# Patient Record
Sex: FEMALE | Race: WHITE | NOT HISPANIC OR LATINO | Employment: FULL TIME | URBAN - METROPOLITAN AREA
[De-identification: names, ages, dates, MRNs, and addresses within clinical notes are randomized per-mention and may not be internally consistent; named-entity substitution may affect disease eponyms.]

---

## 2018-08-10 ENCOUNTER — HOSPITAL ENCOUNTER (OUTPATIENT)
Dept: OTHER | Facility: HOSPITAL | Age: 58
Setting detail: SPECIMEN
Discharge: HOME | End: 2018-08-10
Attending: SURGERY | Admitting: SURGERY

## 2022-09-25 ENCOUNTER — APPOINTMENT (OUTPATIENT)
Dept: GENERAL RADIOLOGY | Facility: HOSPITAL | Age: 62
End: 2022-09-25

## 2022-09-25 ENCOUNTER — APPOINTMENT (OUTPATIENT)
Dept: CARDIOLOGY | Facility: HOSPITAL | Age: 62
End: 2022-09-25

## 2022-09-25 ENCOUNTER — HOSPITAL ENCOUNTER (EMERGENCY)
Facility: HOSPITAL | Age: 62
Discharge: HOME | End: 2022-09-25
Attending: EMERGENCY MEDICINE | Admitting: EMERGENCY MEDICINE

## 2022-09-25 VITALS
RESPIRATION RATE: 16 BRPM | HEART RATE: 97 BPM | OXYGEN SATURATION: 97 % | SYSTOLIC BLOOD PRESSURE: 139 MMHG | DIASTOLIC BLOOD PRESSURE: 63 MMHG | TEMPERATURE: 99 F

## 2022-09-25 DIAGNOSIS — R22.41 LOWER LEG MASS, RIGHT: ICD-10-CM

## 2022-09-25 DIAGNOSIS — S83.8X2A ACUTE MENISCAL INJURY OF LEFT KNEE, INITIAL ENCOUNTER: Primary | ICD-10-CM

## 2022-09-25 LAB
BH CV LOW VAS LEFT POPLITEAL SPONT: 1
BH CV LOWER VASCULAR LEFT COMMON FEMORAL AUGMENT: NORMAL
BH CV LOWER VASCULAR LEFT COMMON FEMORAL COMPETENT: NORMAL
BH CV LOWER VASCULAR LEFT COMMON FEMORAL COMPRESS: NORMAL
BH CV LOWER VASCULAR LEFT COMMON FEMORAL PHASIC: NORMAL
BH CV LOWER VASCULAR LEFT COMMON FEMORAL SPONT: NORMAL
BH CV LOWER VASCULAR LEFT DISTAL FEMORAL COMPRESS: NORMAL
BH CV LOWER VASCULAR LEFT GASTRONEMIUS COMPRESS: NORMAL
BH CV LOWER VASCULAR LEFT GREATER SAPH AK COMPRESS: NORMAL
BH CV LOWER VASCULAR LEFT GREATER SAPH BK COMPRESS: NORMAL
BH CV LOWER VASCULAR LEFT LESSER SAPH COMPRESS: NORMAL
BH CV LOWER VASCULAR LEFT MID FEMORAL AUGMENT: NORMAL
BH CV LOWER VASCULAR LEFT MID FEMORAL COMPETENT: NORMAL
BH CV LOWER VASCULAR LEFT MID FEMORAL COMPRESS: NORMAL
BH CV LOWER VASCULAR LEFT MID FEMORAL PHASIC: NORMAL
BH CV LOWER VASCULAR LEFT MID FEMORAL SPONT: NORMAL
BH CV LOWER VASCULAR LEFT PERONEAL COMPRESS: NORMAL
BH CV LOWER VASCULAR LEFT POPLITEAL AUGMENT: NORMAL
BH CV LOWER VASCULAR LEFT POPLITEAL COMPETENT: NORMAL
BH CV LOWER VASCULAR LEFT POPLITEAL COMPRESS: NORMAL
BH CV LOWER VASCULAR LEFT POPLITEAL PHASIC: NORMAL
BH CV LOWER VASCULAR LEFT POPLITEAL SPONT: NORMAL
BH CV LOWER VASCULAR LEFT POSTERIOR TIBIAL COMPRESS: NORMAL
BH CV LOWER VASCULAR LEFT PROFUNDA FEMORAL COMPRESS: NORMAL
BH CV LOWER VASCULAR LEFT PROXIMAL FEMORAL COMPRESS: NORMAL
BH CV LOWER VASCULAR LEFT SAPHENOFEMORAL JUNCTION COMPRESS: NORMAL
BH CV LOWER VASCULAR RIGHT COMMON FEMORAL AUGMENT: NORMAL
BH CV LOWER VASCULAR RIGHT COMMON FEMORAL COMPETENT: NORMAL
BH CV LOWER VASCULAR RIGHT COMMON FEMORAL COMPRESS: NORMAL
BH CV LOWER VASCULAR RIGHT COMMON FEMORAL PHASIC: NORMAL
BH CV LOWER VASCULAR RIGHT COMMON FEMORAL SPONT: NORMAL
BH CV LOWER VASCULAR RIGHT DISTAL FEMORAL COMPRESS: NORMAL
BH CV LOWER VASCULAR RIGHT GASTRONEMIUS COMPRESS: NORMAL
BH CV LOWER VASCULAR RIGHT GREATER SAPH AK COMPRESS: NORMAL
BH CV LOWER VASCULAR RIGHT GREATER SAPH BK COMPRESS: NORMAL
BH CV LOWER VASCULAR RIGHT LESSER SAPH COMPRESS: NORMAL
BH CV LOWER VASCULAR RIGHT MID FEMORAL AUGMENT: NORMAL
BH CV LOWER VASCULAR RIGHT MID FEMORAL COMPETENT: NORMAL
BH CV LOWER VASCULAR RIGHT MID FEMORAL COMPRESS: NORMAL
BH CV LOWER VASCULAR RIGHT MID FEMORAL PHASIC: NORMAL
BH CV LOWER VASCULAR RIGHT MID FEMORAL SPONT: NORMAL
BH CV LOWER VASCULAR RIGHT PERONEAL COMPRESS: NORMAL
BH CV LOWER VASCULAR RIGHT POPLITEAL AUGMENT: NORMAL
BH CV LOWER VASCULAR RIGHT POPLITEAL COMPETENT: NORMAL
BH CV LOWER VASCULAR RIGHT POPLITEAL COMPRESS: NORMAL
BH CV LOWER VASCULAR RIGHT POPLITEAL PHASIC: NORMAL
BH CV LOWER VASCULAR RIGHT POPLITEAL SPONT: NORMAL
BH CV LOWER VASCULAR RIGHT POSTERIOR TIBIAL COMPRESS: NORMAL
BH CV LOWER VASCULAR RIGHT PROFUNDA FEMORAL COMPRESS: NORMAL
BH CV LOWER VASCULAR RIGHT PROXIMAL FEMORAL COMPRESS: NORMAL
BH CV LOWER VASCULAR RIGHT SAPHENOFEMORAL JUNCTION COMPRESS: NORMAL
BH CV POP FLUID COLLECT LEFT: 1
MAXIMAL PREDICTED HEART RATE: 160 BPM
STRESS TARGET HR: 136 BPM

## 2022-09-25 PROCEDURE — 73560 X-RAY EXAM OF KNEE 1 OR 2: CPT

## 2022-09-25 PROCEDURE — 99282 EMERGENCY DEPT VISIT SF MDM: CPT

## 2022-09-25 PROCEDURE — 73590 X-RAY EXAM OF LOWER LEG: CPT

## 2022-09-25 PROCEDURE — 93970 EXTREMITY STUDY: CPT

## 2022-09-25 RX ORDER — DICLOFENAC SODIUM 75 MG/1
75 TABLET, DELAYED RELEASE ORAL 2 TIMES DAILY PRN
Qty: 20 TABLET | Refills: 0 | Status: SHIPPED | OUTPATIENT
Start: 2022-09-25

## 2022-09-25 NOTE — ED PROVIDER NOTES
EMERGENCY DEPARTMENT ENCOUNTER    Room Number:  01/01  Date of encounter:  9/25/2022  PCP: Provider, No Known  Historian: Patient      I used full protective equipment while examining this patient.  This includes face mask, gloves and protective eyewear.  I washed my hands before entering the room and immediately upon leaving the room      HPI:  Chief Complaint: Left knee pain, right lower leg pain  A complete HPI/ROS/PMH/PSH/SH/FH are unobtainable due to: Nothing    Context: Nhoemi Schmitt is a 62 y.o. female who presents to the ED c/o approximate 1 week history of gradual onset, constant, atraumatic left knee, as well as right lower leg pain.  Patient localizes her left knee pain to the medial joint line.  The pain is worse with ambulating and flexion.  She denies any redness, swelling, fever.  The pain is achy and moderate.  There is no radiation of pain.  She denies any prior knee injuries.    In addition patient complains of a swollen area to her right anterior lower leg.  She denies any injury to this.  She denies any calf pain.  She denies any numbness or tingling distally.  She is concerned possibly that this area has been irritated patient is concerned about blood clots in both of her legs.  She denies any chest pain or shortness of breath.  She denies any hormone usage.  She denies any prior history of blood clots.  By her boot.    Review of Medical Records  No pertinent previous medical records found in [facility].    PAST MEDICAL HISTORY  Active Ambulatory Problems     Diagnosis Date Noted   • No Active Ambulatory Problems     Resolved Ambulatory Problems     Diagnosis Date Noted   • No Resolved Ambulatory Problems     No Additional Past Medical History         PAST SURGICAL HISTORY  History reviewed. No pertinent surgical history.      FAMILY HISTORY  History reviewed. No pertinent family history.      SOCIAL HISTORY  Social History     Socioeconomic History   • Marital status:           ALLERGIES  Penicillins        REVIEW OF SYSTEMS  All systems reviewed and negative except for those discussed in HPI.       PHYSICAL EXAM    I have reviewed the triage vital signs and nursing notes.    ED Triage Vitals [09/25/22 1535]   Temp Heart Rate Resp BP SpO2   99 °F (37.2 °C) 97 16 -- 97 %      Temp src Heart Rate Source Patient Position BP Location FiO2 (%)   Tympanic -- -- -- --       Physical Exam  GENERAL: Alert, oriented, not distressed  HENT: head atraumatic, no nuchal rigidity  EYES: no scleral icterus, EOMI  CV: regular rhythm, regular rate, no murmur  RESPIRATORY: normal effort, CTA  ABDOMEN: soft, nontender  MUSCULOSKELETAL: Mild tenderness to the left medial joint line of the knee.  No significant effusion.  No warmth or erythema.  Remainder of the knee is nontender.  No laxity.  Left calf is nontender and not swollen.  Neurovascularly intact distally.  There is a soft flesh-colored mass to the right distal lower anterior leg.  No induration or erythema.  It appears in the soft tissue separate from the bone.  There is no right calf swelling  or tenderness.    NEURO: alert, moves all extremities, follows commands  SKIN: warm, dry        LAB RESULTS  Recent Results (from the past 24 hour(s))   Duplex Venous Lower Extremity - Bilateral CAR    Collection Time: 09/25/22  5:19 PM   Result Value Ref Range    Target HR (85%) 136 bpm    Max. Pred. HR (100%) 160 bpm    Left Popliteal Spont 1     Right Common Femoral Spont Y     Right Common Femoral Phasic Y     Right Common Femoral Augment Y     Right Common Femoral Competent Y     Right Common Femoral Compress C     Right Saphenofemoral Junction Compress C     Right Profunda Femoral Compress C     Right Proximal Femoral Compress C     Right Mid Femoral Spont Y     Right Mid Femoral Phasic Y     Right Mid Femoral Augment Y     Right Mid Femoral Competent Y     Right Mid Femoral Compress C     Right Distal Femoral Compress C     Right Popliteal Spont  Y     Right Popliteal Phasic Y     Right Popliteal Augment Y     Right Popliteal Competent Y     Right Popliteal Compress C     Right Posterior Tibial Compress C     Right Peroneal Compress C     Right Gastronemius Compress C     Right Greater Saph AK Compress C     Right Greater Saph BK Compress C     Right Lesser Saph Compress C     Left Common Femoral Spont Y     Left Common Femoral Phasic Y     Left Common Femoral Augment Y     Left Common Femoral Competent Y     Left Common Femoral Compress C     Left Saphenofemoral Junction Compress C     Left Profunda Femoral Compress C     Left Proximal Femoral Compress C     Left Mid Femoral Spont Y     Left Mid Femoral Phasic Y     Left Mid Femoral Augment Y     Left Mid Femoral Competent Y     Left Mid Femoral Compress C     Left Distal Femoral Compress C     Left Popliteal Spont Y     Left Popliteal Phasic Y     Left Popliteal Augment Y     Left Popliteal Competent Y     Left Popliteal Compress C     Left Posterior Tibial Compress C     Left Peroneal Compress C     Left Gastronemius Compress C     Left Greater Saph AK Compress C     Left Greater Saph BK Compress C     Left Lesser Saph Compress C     BH CV POP FLUID COLLECT LEFT 1        Ordered the above labs and independently reviewed the results.        RADIOLOGY  XR Knee 1 or 2 View Left, XR Tibia Fibula 2 View Right    Result Date: 9/25/2022  XR KNEE 1 OR 2 VW LEFT-, XR TIBIA FIBULA 2 VW RIGHT-  INDICATIONS: Pain  TECHNIQUE: 2 views of the left knee, frontal and lateral views of the right lower leg.  COMPARISON: None available  FINDINGS:  Left knee: Mild degenerative spurring is present. Minimal knee effusion is suggested. An ossific possible loose body medially adjacent to the medial femoral condyle measures 9 mm.  Right lower leg: Moderate degenerative spurring is seen at the knee with moderate medial tibiofemoral joint space narrowing. Dystrophic soft tissue calcifications are seen in the lower leg. Soft tissue  swelling is apparent, especially anteriorly at the distal lower leg, that could be evidence of inflammation, infection, injury, correlate clinically. Moderate calcaneal spurring is present.  Follow-up/further evaluation can be obtained as indications persist.       As described.  This report was finalized on 9/25/2022 4:51 PM by Dr. Gio Barba M.D.        I ordered the above noted radiological studies. Reviewed by me and discussed with radiologist.  See dictation for official radiology interpretation.      MEDICATIONS GIVEN IN ER    Medications - No data to display      PROGRESS, DATA ANALYSIS, CONSULTS, AND MEDICAL DECISION MAKING    All labs have been independently reviewed by me.  All radiology studies have been reviewed by me and discussed with radiologist dictating the report.   EKG's independently viewed and interpreted by me.  Discussion below represents my analysis of pertinent findings related to patient's condition, differential diagnosis, treatment plan and final disposition.    I have discussed case with Dr. Plaza, emergency room physician.  He has performed his own bedside examination and agrees with treatment plan.    ED Course as of 09/25/22 2004   Sun Sep 25, 2022   1617 Patient presents with 1 week history of left knee pain and right anterior lower leg pain.  No evidence of septic joint, arterial occlusion.  Patient concerned of blood clots.  Plan for x-rays of left knee and bilateral lower extremity venous Dopplers. [EE]   1643 Right tib-fib films interpreted myself show no bony abnormalities.  Left knee shows medial narrowing without acute fracture. [EE]   1712 I looked at the x-ray of the knee as well as of the tib-fib.  There is degenerative changes and spurring that is present in the knee with minimal knee effusion there is also moderate degenerative changes and spurring seen at the medial tibiofemoral joint space with some narrowing.  I have also reviewed the radiologist report.  [MM]   1721 I discussed ultrasound findings with the technician.  No evidence of DVT bilaterally.  There is a small amount of fluid in the popliteal area on the left knee. [EE]      ED Course User Index  [EE] Jai Carr PA  [MM] Erwin Plaza MD       AS OF 20:04 EDT VITALS:    BP - 139/63  HR - 97  TEMP - 99 °F (37.2 °C) (Tympanic)  O2 SATS - 97%        DIAGNOSIS  Final diagnoses:   Acute meniscal injury of left knee, initial encounter   Lower leg mass, right         DISPOSITION  Discharged      Dictated utilizing Dragon dictation     Jai Carr PA  09/25/22 2005

## 2022-09-25 NOTE — ED PROVIDER NOTES
I supervised care provided by the midlevel provider.   We have discussed this patient's history, physical exam, and treatment plan.  I have reviewed the note and personally saw and examined the patient and agree with the plan of care.   I have seen and evaluated this patient.  She is had about 1 week history of some pain in her left knee and is mainly in the medial aspect of the left knee.  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  Pain is worse when she bends or stands on her left lower extremity and bends her left knee.  There is no report of fevers or chills.  Denies any redness.  Denies any known history of any definitive knee problems in the past.  She has had no trauma to the knee.  When she sits still and the leg is still does not have much or any pain in the left knee.    GENERAL: Pleasant female.  Morbidly obese not distressed  HENT: nares patent  Head/neck/ face are symmetric without gross deformity or swelling  EYES: no scleral icterus  CV: regular rhythm, regular rate with intact distal pulses  RESPIRATORY: normal effort and no respiratory distress  ABDOMEN: soft and nontender  MUSCULOSKELETAL: Lower extremities appear symmetric.  There are obese.  There is no erythema or warmth appreciated.  Her location of pain is the medial aspect of the knee mainly at the medial tibia femoral condyle.  Pain is reproducible with palpation in that location as well as flexion and extension.  She has intact distal pulses that are equal strong and symmetric.  There is no cyanosis, coolness, or pallor.  Compartments are soft.  There is no motor or sensory changes.  NEURO: alert and appropriate, moves all extremities, follows commands  SKIN: warm, dry    Vital signs and nursing notes reviewed.    Plan patient had a Doppler of bilateral lower extremities which was negative.  Patient wanted to make sure she did not have a clot.  I suspect this is  musculoskeletal in etiology.  She has significant arthritic changes and I believe that is her main etiology of symptoms.  We will give an orthopedic for her to follow-up.  I informed her she needs to do no prolonged standing or walking as that will aggravate her symptoms.  All questions answered at this time          ED Course as of 09/25/22 2234   Sun Sep 25, 2022   1617 Patient presents with 1 week history of left knee pain and right anterior lower leg pain.  No evidence of septic joint, arterial occlusion.  Patient concerned of blood clots.  Plan for x-rays of left knee and bilateral lower extremity venous Dopplers. [EE]   1643 Right tib-fib films interpreted myself show no bony abnormalities.  Left knee shows medial narrowing without acute fracture. [EE]   1712 I looked at the x-ray of the knee as well as of the tib-fib.  There is degenerative changes and spurring that is present in the knee with minimal knee effusion there is also moderate degenerative changes and spurring seen at the medial tibiofemoral joint space with some narrowing.  I have also reviewed the radiologist report. [MM]   1721 I discussed ultrasound findings with the technician.  No evidence of DVT bilaterally.  There is a small amount of fluid in the popliteal area on the left knee. [EE]      ED Course User Index  [EE] Jai Carr PA  [MM] Erwin Plaza MD       We are currently under a pandemic from the COVID19 infection.  The patient presented to the emergency department by ambulance or personal vehicle. I followed the current protocols required by Infection Control at [facility] in my evaluation and treatment of the patient. The patient was wearing a face mask during my evaluation and throughout my encounter. During my whole encounter with this patient I used appropriate personal protective equipment.  This equipment consisted of eye protection, facemask, gown, and gloves.  I applied this equipment before entering the  room.           Erwin Plaza MD  09/25/22 3116

## 2022-09-25 NOTE — ED TRIAGE NOTES
"Pt complains of left knee pain for the last few days. Denies any known injury. States that the pain is worse when she bends it. Also states that she has a \"knot\" on her right leg. Pt is ambulatory in triage.    Patient masked at arrival and triage staff wore all appropriate PPE during entire encounter with patient.     "